# Patient Record
Sex: FEMALE | Race: WHITE | URBAN - METROPOLITAN AREA
[De-identification: names, ages, dates, MRNs, and addresses within clinical notes are randomized per-mention and may not be internally consistent; named-entity substitution may affect disease eponyms.]

---

## 2022-07-07 ENCOUNTER — TELEPHONE (OUTPATIENT)
Dept: OBGYN CLINIC | Age: 22
End: 2022-07-07

## 2022-07-07 NOTE — TELEPHONE ENCOUNTER
GYN patient called stating that she took one pill from her OCP pack last week. She lost her pill pack and has recently found it. Pt states she had a few episodes of unprotected sex when she missed her OCP. Pt wanted to know what she needs to do about starting her OCP back up. Advised since she has missed more than two doses to wait to restart pills until her has her next menstrual cycle. Advised to use back up protected now and for a month after restarting her OCP. Pt is aware to check UPT if no menses in the next 3-4 weeks. All questions answered. She v/u.

## 2023-01-11 ENCOUNTER — TELEPHONE (OUTPATIENT)
Dept: OBGYN CLINIC | Age: 23
End: 2023-01-11

## 2023-01-11 NOTE — TELEPHONE ENCOUNTER
Pt returned call stating that she is wondering if she could be pregnant. She stopped OCPs in July 2022 and is not using anything for birth control at this time. She states that her LMP was 12/26/22 and wanted to know if she could take a pregnancy test now. She is having some light spotting. Pt advised it would be too early to take a pregnancy test at this time, she should wait for a missed period. She is advised to check a pregnancy test with her first morning urine. She is advised that she should begin taking a prenatal vitamin and monitor bleeding at this time. If progresses to heavier than spotting she is advised to call back. She voiced full understanding and is aware. All questions answered and pt advised to call back PRN.

## 2023-01-11 NOTE — TELEPHONE ENCOUNTER
Pt called and lvm stating that she had questions and requested a call back. LVM for pt to return my call.